# Patient Record
Sex: FEMALE | Race: WHITE
[De-identification: names, ages, dates, MRNs, and addresses within clinical notes are randomized per-mention and may not be internally consistent; named-entity substitution may affect disease eponyms.]

---

## 2020-07-09 ENCOUNTER — APPOINTMENT (OUTPATIENT)
Dept: BARIATRICS/WEIGHT MGMT | Facility: CLINIC | Age: 65
End: 2020-07-09
Payer: COMMERCIAL

## 2020-07-09 DIAGNOSIS — L40.50 ARTHROPATHIC PSORIASIS, UNSPECIFIED: ICD-10-CM

## 2020-07-09 DIAGNOSIS — E66.3 OVERWEIGHT: ICD-10-CM

## 2020-07-09 PROCEDURE — 99214 OFFICE O/P EST MOD 30 MIN: CPT | Mod: 95

## 2020-08-12 NOTE — HISTORY OF PRESENT ILLNESS
[Home] : at home, [unfilled] , at the time of the visit. [Medical Office: ([facility])___] : at the medical office located in  [Verbal consent obtained from patient] : the patient, [unfilled] [FreeTextEntry1] : 65 yo woman with overweight/obesity returns for f/u.  Last visit a few years ago at which time she weight 130lbs.  She dropped a few more lbs and remained comfortably in the upper 120s.  She was started on huira, methotrexate and folic acid and weight went up to 135 lbs (has psoriatic arthritis).  Also had a hip replacement since last seen.  Reports that her body is in pretty good shape and she is at least as active as she was prior.  She has been in [id number] range, though and is having a difficult time getting weight back down. Lifetime max weight = 186.

## 2020-08-12 NOTE — ASSESSMENT
[FreeTextEntry1] : BARIATRIC SURGERY HISTORY: none\par \par OBESITY COMORBIDITIES: none\par \par ANTI-OBESITY MEDICATIONS: none\par \par OBESITY MEDICATION SIDE EFFECTS: n/a\par \par 66 yo woman with obesity, psoriatic arthritis and weight regain\par \par recommend the following:\par forensic investigation of everything she is doing -exercise, sleep, food log\par consider time restricted feeding with early day eating window\par consider anti-obesity medication\par discussed concept of adaptive thermogenesis\par reasonable that pandemic has reduced day to day calorie expenditure in setting of adaptive themogesis making weight loss difficult\par can recheck metabolic labs\par \par after a month can re-discuss with the information provided\par

## 2022-07-21 ENCOUNTER — OFFICE (OUTPATIENT)
Dept: URBAN - METROPOLITAN AREA CLINIC 77 | Facility: CLINIC | Age: 67
Setting detail: OPHTHALMOLOGY
End: 2022-07-21
Payer: COMMERCIAL

## 2022-07-21 ENCOUNTER — RX ONLY (RX ONLY)
Age: 67
End: 2022-07-21

## 2022-07-21 DIAGNOSIS — L40.59: ICD-10-CM

## 2022-07-21 DIAGNOSIS — H35.341: ICD-10-CM

## 2022-07-21 DIAGNOSIS — Z79.899: ICD-10-CM

## 2022-07-21 DIAGNOSIS — H16.223: ICD-10-CM

## 2022-07-21 DIAGNOSIS — H43.812: ICD-10-CM

## 2022-07-21 DIAGNOSIS — H25.13: ICD-10-CM

## 2022-07-21 PROCEDURE — 99214 OFFICE O/P EST MOD 30 MIN: CPT | Performed by: OPHTHALMOLOGY

## 2022-07-21 PROCEDURE — 92134 CPTRZ OPH DX IMG PST SGM RTA: CPT | Performed by: OPHTHALMOLOGY

## 2022-07-21 PROCEDURE — 92201 OPSCPY EXTND RTA DRAW UNI/BI: CPT | Performed by: OPHTHALMOLOGY

## 2022-07-21 PROCEDURE — 92083 EXTENDED VISUAL FIELD XM: CPT | Performed by: OPHTHALMOLOGY

## 2022-07-21 ASSESSMENT — REFRACTION_MANIFEST
OD_CYLINDER: -1.00
OS_CYLINDER: -0.75
OD_AXIS: 55
OS_SPHERE: -3.50
OS_VA1: 20/30
OS_ADD: +3.00
OD_VA1: 20/50
OD_ADD: +3.00
OS_AXIS: 115
OD_SPHERE: -4.00

## 2022-07-21 ASSESSMENT — REFRACTION_CURRENTRX
OS_ADD: +3.00
OD_ADD: +3.00
OD_OVR_VA: 20/
OD_OVR_VA: 20/
OS_AXIS: 120
OS_OVR_VA: 20/
OS_CYLINDER: -0.75
OD_CYLINDER: -1.00
OD_SPHERE: -4.00
OD_AXIS: 50
OS_OVR_VA: 20/
OS_SPHERE: -3.50

## 2022-07-21 ASSESSMENT — AXIALLENGTH_DERIVED
OD_AL: 25.0095
OS_AL: 24.8943
OS_AL: 24.8943
OD_AL: 25.0643

## 2022-07-21 ASSESSMENT — REFRACTION_AUTOREFRACTION
OS_AXIS: 115
OD_CYLINDER: -1.25
OS_CYLINDER: -1.25
OD_SPHERE: -3.75
OD_AXIS: 58
OS_SPHERE: -3.25

## 2022-07-21 ASSESSMENT — KERATOMETRY
OS_AXISANGLE_DEGREES: 014
OS_K1POWER_DIOPTERS: 43.75
OS_K2POWER_DIOPTERS: 44.75
OD_K2POWER_DIOPTERS: 44.75
OD_AXISANGLE_DEGREES: 172
OD_K1POWER_DIOPTERS: 44.25

## 2022-07-21 ASSESSMENT — SPHEQUIV_DERIVED
OS_SPHEQUIV: -3.875
OD_SPHEQUIV: -4.375
OD_SPHEQUIV: -4.5
OS_SPHEQUIV: -3.875

## 2022-07-21 ASSESSMENT — CONFRONTATIONAL VISUAL FIELD TEST (CVF)
OS_FINDINGS: FULL
OD_FINDINGS: FULL

## 2022-07-21 ASSESSMENT — SUPERFICIAL PUNCTATE KERATITIS (SPK)
OD_SPK: 1+
OS_SPK: 1+

## 2022-07-21 ASSESSMENT — VISUAL ACUITY
OD_BCVA: 20/30
OS_BCVA: 20/50

## 2022-07-21 ASSESSMENT — TONOMETRY
OD_IOP_MMHG: 10
OS_IOP_MMHG: 15

## 2022-09-12 ENCOUNTER — OFFICE (OUTPATIENT)
Dept: URBAN - METROPOLITAN AREA CLINIC 76 | Facility: CLINIC | Age: 67
Setting detail: OPHTHALMOLOGY
End: 2022-09-12
Payer: COMMERCIAL

## 2022-09-12 DIAGNOSIS — L40.59: ICD-10-CM

## 2022-09-12 DIAGNOSIS — H01.002: ICD-10-CM

## 2022-09-12 DIAGNOSIS — H01.004: ICD-10-CM

## 2022-09-12 DIAGNOSIS — Z79.899: ICD-10-CM

## 2022-09-12 DIAGNOSIS — H01.005: ICD-10-CM

## 2022-09-12 DIAGNOSIS — H01.001: ICD-10-CM

## 2022-09-12 DIAGNOSIS — H25.13: ICD-10-CM

## 2022-09-12 DIAGNOSIS — H16.223: ICD-10-CM

## 2022-09-12 PROBLEM — H43.812 POSTERIOR VITREOUS DETACHMENT; LEFT EYE: Status: ACTIVE | Noted: 2022-07-21

## 2022-09-12 PROBLEM — H35.341 MACULAR HOLE OR PSEUDOHOLE; RIGHT EYE: Status: ACTIVE | Noted: 2022-07-21

## 2022-09-12 PROCEDURE — 99213 OFFICE O/P EST LOW 20 MIN: CPT | Performed by: OPHTHALMOLOGY

## 2022-09-12 ASSESSMENT — KERATOMETRY
OS_AXISANGLE_DEGREES: 017
OD_K2POWER_DIOPTERS: 44.75
OD_K1POWER_DIOPTERS: 44.00
OS_K2POWER_DIOPTERS: 44.75
OS_K1POWER_DIOPTERS: 43.75
OD_AXISANGLE_DEGREES: 169

## 2022-09-12 ASSESSMENT — DRY EYES - PHYSICIAN NOTES
OD_GENERALCOMMENTS: TBUT 3 SEC
OS_GENERALCOMMENTS: TBUT 3 SEC

## 2022-09-12 ASSESSMENT — REFRACTION_CURRENTRX
OD_OVR_VA: 20/
OS_SPHERE: -3.50
OD_AXIS: 50
OD_ADD: +3.00
OD_SPHERE: -4.00
OS_OVR_VA: 20/
OD_OVR_VA: 20/
OS_ADD: +3.00
OD_CYLINDER: -1.00
OS_AXIS: 120
OS_CYLINDER: -0.75
OS_OVR_VA: 20/

## 2022-09-12 ASSESSMENT — REFRACTION_MANIFEST
OD_ADD: +3.00
OD_CYLINDER: -1.00
OS_ADD: +3.00
OS_CYLINDER: -0.75
OS_SPHERE: -3.50
OS_VA1: 20/30
OD_AXIS: 55
OD_VA1: 20/50
OD_SPHERE: -4.00
OS_AXIS: 115

## 2022-09-12 ASSESSMENT — AXIALLENGTH_DERIVED
OD_AL: 25.1716
OD_AL: 25.1162
OS_AL: 24.8943
OS_AL: 24.9486

## 2022-09-12 ASSESSMENT — TEAR BREAK UP TIME (TBUT)
OD_TBUT: 2+
OS_TBUT: 2+

## 2022-09-12 ASSESSMENT — VISUAL ACUITY
OD_BCVA: 20/40
OS_BCVA: 20/50

## 2022-09-12 ASSESSMENT — SPHEQUIV_DERIVED
OD_SPHEQUIV: -4.625
OD_SPHEQUIV: -4.5
OS_SPHEQUIV: -4
OS_SPHEQUIV: -3.875

## 2022-09-12 ASSESSMENT — TONOMETRY
OD_IOP_MMHG: 14
OS_IOP_MMHG: 15

## 2022-09-12 ASSESSMENT — REFRACTION_AUTOREFRACTION
OS_CYLINDER: -1.50
OS_SPHERE: -3.25
OD_AXIS: 078
OS_AXIS: 104
OD_SPHERE: -3.75
OD_CYLINDER: -1.75

## 2022-09-12 ASSESSMENT — LID EXAM ASSESSMENTS
OD_BLEPHARITIS: 1+
OS_BLEPHARITIS: 1+

## 2022-09-12 ASSESSMENT — SUPERFICIAL PUNCTATE KERATITIS (SPK)
OS_SPK: 1+
OD_SPK: 1+

## 2022-12-19 ENCOUNTER — OFFICE (OUTPATIENT)
Dept: URBAN - METROPOLITAN AREA CLINIC 77 | Facility: CLINIC | Age: 67
Setting detail: OPHTHALMOLOGY
End: 2022-12-19
Payer: COMMERCIAL

## 2022-12-19 ENCOUNTER — RX ONLY (RX ONLY)
Age: 67
End: 2022-12-19

## 2022-12-19 DIAGNOSIS — H01.002: ICD-10-CM

## 2022-12-19 DIAGNOSIS — H01.004: ICD-10-CM

## 2022-12-19 DIAGNOSIS — H35.341: ICD-10-CM

## 2022-12-19 DIAGNOSIS — Z79.899: ICD-10-CM

## 2022-12-19 DIAGNOSIS — L63.8: ICD-10-CM

## 2022-12-19 DIAGNOSIS — H16.223: ICD-10-CM

## 2022-12-19 DIAGNOSIS — L40.59: ICD-10-CM

## 2022-12-19 DIAGNOSIS — H01.001: ICD-10-CM

## 2022-12-19 DIAGNOSIS — H43.812: ICD-10-CM

## 2022-12-19 DIAGNOSIS — H01.005: ICD-10-CM

## 2022-12-19 DIAGNOSIS — H25.13: ICD-10-CM

## 2022-12-19 PROCEDURE — 92250 FUNDUS PHOTOGRAPHY W/I&R: CPT | Performed by: OPHTHALMOLOGY

## 2022-12-19 PROCEDURE — 99214 OFFICE O/P EST MOD 30 MIN: CPT | Performed by: OPHTHALMOLOGY

## 2022-12-19 ASSESSMENT — KERATOMETRY
OS_K1POWER_DIOPTERS: 43.75
OD_K1POWER_DIOPTERS: 44.00
OS_K2POWER_DIOPTERS: 44.75
OD_K2POWER_DIOPTERS: 44.75
OD_AXISANGLE_DEGREES: 169
OS_AXISANGLE_DEGREES: 017

## 2022-12-19 ASSESSMENT — DRY EYES - PHYSICIAN NOTES
OS_GENERALCOMMENTS: TBUT 3 SEC
OD_GENERALCOMMENTS: TBUT 3 SEC

## 2022-12-19 ASSESSMENT — LID EXAM ASSESSMENTS
OS_BLEPHARITIS: 1+
OD_BLEPHARITIS: 1+

## 2022-12-19 ASSESSMENT — REFRACTION_CURRENTRX
OS_OVR_VA: 20/
OD_ADD: +3.00
OS_ADD: +3.00
OD_OVR_VA: 20/
OS_AXIS: 120
OS_SPHERE: -3.50
OS_CYLINDER: -0.75
OD_SPHERE: -4.00
OS_OVR_VA: 20/
OD_CYLINDER: -1.00
OD_OVR_VA: 20/
OD_AXIS: 50

## 2022-12-19 ASSESSMENT — AXIALLENGTH_DERIVED
OS_AL: 24.8943
OD_AL: 25.1716
OS_AL: 24.9486
OD_AL: 25.1162

## 2022-12-19 ASSESSMENT — REFRACTION_AUTOREFRACTION
OS_SPHERE: -3.25
OS_CYLINDER: -1.50
OD_AXIS: 078
OD_SPHERE: -3.75
OD_CYLINDER: -1.75
OS_AXIS: 104

## 2022-12-19 ASSESSMENT — SUPERFICIAL PUNCTATE KERATITIS (SPK)
OS_SPK: 1+
OD_SPK: 1+

## 2022-12-19 ASSESSMENT — REFRACTION_MANIFEST
OD_VA1: 20/50
OD_CYLINDER: -1.00
OD_AXIS: 55
OS_VA1: 20/30
OS_SPHERE: -3.50
OD_SPHERE: -4.00
OS_CYLINDER: -0.75
OS_AXIS: 115
OS_ADD: +3.00
OD_ADD: +3.00

## 2022-12-19 ASSESSMENT — TONOMETRY
OS_IOP_MMHG: 13
OD_IOP_MMHG: 13

## 2022-12-19 ASSESSMENT — SPHEQUIV_DERIVED
OS_SPHEQUIV: -3.875
OS_SPHEQUIV: -4
OD_SPHEQUIV: -4.625
OD_SPHEQUIV: -4.5

## 2022-12-19 ASSESSMENT — CONFRONTATIONAL VISUAL FIELD TEST (CVF)
OS_FINDINGS: FULL
OD_FINDINGS: FULL

## 2022-12-19 ASSESSMENT — VISUAL ACUITY
OS_BCVA: 20/60
OD_BCVA: 20/50

## 2022-12-19 ASSESSMENT — TEAR BREAK UP TIME (TBUT)
OD_TBUT: 2+
OS_TBUT: 2+

## 2023-02-13 ENCOUNTER — OFFICE (OUTPATIENT)
Dept: URBAN - METROPOLITAN AREA CLINIC 77 | Facility: CLINIC | Age: 68
Setting detail: OPHTHALMOLOGY
End: 2023-02-13
Payer: COMMERCIAL

## 2023-02-13 DIAGNOSIS — H35.341: ICD-10-CM

## 2023-02-13 DIAGNOSIS — H01.005: ICD-10-CM

## 2023-02-13 DIAGNOSIS — H16.223: ICD-10-CM

## 2023-02-13 DIAGNOSIS — H01.001: ICD-10-CM

## 2023-02-13 DIAGNOSIS — L40.59: ICD-10-CM

## 2023-02-13 DIAGNOSIS — H01.002: ICD-10-CM

## 2023-02-13 DIAGNOSIS — L63.8: ICD-10-CM

## 2023-02-13 DIAGNOSIS — H01.004: ICD-10-CM

## 2023-02-13 DIAGNOSIS — Z79.899: ICD-10-CM

## 2023-02-13 DIAGNOSIS — H43.812: ICD-10-CM

## 2023-02-13 DIAGNOSIS — H25.13: ICD-10-CM

## 2023-02-13 PROCEDURE — 92250 FUNDUS PHOTOGRAPHY W/I&R: CPT | Performed by: OPHTHALMOLOGY

## 2023-02-13 PROCEDURE — 99213 OFFICE O/P EST LOW 20 MIN: CPT | Performed by: OPHTHALMOLOGY

## 2023-02-13 ASSESSMENT — REFRACTION_CURRENTRX
OD_OVR_VA: 20/
OS_AXIS: 120
OS_OVR_VA: 20/
OS_ADD: +3.00
OD_AXIS: 50
OD_ADD: +3.00
OD_SPHERE: -4.00
OD_OVR_VA: 20/
OS_SPHERE: -3.50
OS_CYLINDER: -0.75
OS_OVR_VA: 20/
OD_CYLINDER: -1.00

## 2023-02-13 ASSESSMENT — SPHEQUIV_DERIVED
OD_SPHEQUIV: -4.625
OS_SPHEQUIV: -4
OD_SPHEQUIV: -4.5
OS_SPHEQUIV: -3.875

## 2023-02-13 ASSESSMENT — AXIALLENGTH_DERIVED
OD_AL: 25.1716
OS_AL: 24.8943
OS_AL: 24.9486
OD_AL: 25.1162

## 2023-02-13 ASSESSMENT — REFRACTION_MANIFEST
OS_CYLINDER: -0.75
OD_CYLINDER: -1.00
OD_VA1: 20/50
OS_SPHERE: -3.50
OD_ADD: +3.00
OS_VA1: 20/30
OD_SPHERE: -4.00
OS_AXIS: 115
OD_AXIS: 55
OS_ADD: +3.00

## 2023-02-13 ASSESSMENT — CONFRONTATIONAL VISUAL FIELD TEST (CVF)
OS_FINDINGS: FULL
OD_FINDINGS: FULL

## 2023-02-13 ASSESSMENT — VISUAL ACUITY
OS_BCVA: 20/60
OD_BCVA: 20/50

## 2023-02-13 ASSESSMENT — REFRACTION_AUTOREFRACTION
OS_AXIS: 104
OD_AXIS: 078
OS_SPHERE: -3.25
OS_CYLINDER: -1.50
OD_CYLINDER: -1.75
OD_SPHERE: -3.75

## 2023-02-13 ASSESSMENT — KERATOMETRY
OD_K1POWER_DIOPTERS: 44.00
OD_K2POWER_DIOPTERS: 44.75
OS_K2POWER_DIOPTERS: 44.75
OS_K1POWER_DIOPTERS: 43.75
OD_AXISANGLE_DEGREES: 169
OS_AXISANGLE_DEGREES: 017

## 2023-02-13 ASSESSMENT — SUPERFICIAL PUNCTATE KERATITIS (SPK)
OD_SPK: 1+
OS_SPK: 1+

## 2023-02-13 ASSESSMENT — LID EXAM ASSESSMENTS
OD_BLEPHARITIS: 1+
OS_BLEPHARITIS: 1+

## 2023-02-13 ASSESSMENT — TEAR BREAK UP TIME (TBUT)
OD_TBUT: 2+
OS_TBUT: 2+

## 2023-02-13 ASSESSMENT — DRY EYES - PHYSICIAN NOTES
OS_GENERALCOMMENTS: TBUT 3 SEC
OD_GENERALCOMMENTS: TBUT 3 SEC

## 2023-07-24 ENCOUNTER — OFFICE (OUTPATIENT)
Dept: URBAN - METROPOLITAN AREA CLINIC 77 | Facility: CLINIC | Age: 68
Setting detail: OPHTHALMOLOGY
End: 2023-07-24
Payer: COMMERCIAL

## 2023-07-24 DIAGNOSIS — L63.8: ICD-10-CM

## 2023-07-24 DIAGNOSIS — L40.59: ICD-10-CM

## 2023-07-24 DIAGNOSIS — H01.005: ICD-10-CM

## 2023-07-24 DIAGNOSIS — H01.001: ICD-10-CM

## 2023-07-24 DIAGNOSIS — H25.13: ICD-10-CM

## 2023-07-24 DIAGNOSIS — H43.812: ICD-10-CM

## 2023-07-24 DIAGNOSIS — H35.341: ICD-10-CM

## 2023-07-24 DIAGNOSIS — H01.004: ICD-10-CM

## 2023-07-24 DIAGNOSIS — H01.002: ICD-10-CM

## 2023-07-24 DIAGNOSIS — H16.223: ICD-10-CM

## 2023-07-24 DIAGNOSIS — Z79.899: ICD-10-CM

## 2023-07-24 PROCEDURE — 92250 FUNDUS PHOTOGRAPHY W/I&R: CPT | Performed by: OPHTHALMOLOGY

## 2023-07-24 PROCEDURE — 99214 OFFICE O/P EST MOD 30 MIN: CPT | Performed by: OPHTHALMOLOGY

## 2023-07-24 ASSESSMENT — REFRACTION_CURRENTRX
OS_AXIS: 120
OD_OVR_VA: 20/
OD_SPHERE: -4.00
OS_SPHERE: -3.50
OS_ADD: +3.00
OD_AXIS: 50
OD_OVR_VA: 20/
OS_OVR_VA: 20/
OS_CYLINDER: -0.75
OS_OVR_VA: 20/
OD_CYLINDER: -1.00
OD_ADD: +3.00

## 2023-07-24 ASSESSMENT — REFRACTION_AUTOREFRACTION
OS_CYLINDER: -1.50
OD_CYLINDER: -1.75
OD_SPHERE: -3.75
OS_SPHERE: -3.25
OD_AXIS: 078
OS_AXIS: 104

## 2023-07-24 ASSESSMENT — LID EXAM ASSESSMENTS
OS_BLEPHARITIS: 1+
OD_BLEPHARITIS: 1+

## 2023-07-24 ASSESSMENT — KERATOMETRY
OS_K1POWER_DIOPTERS: 43.75
OS_K2POWER_DIOPTERS: 44.75
OD_AXISANGLE_DEGREES: 169
OD_K2POWER_DIOPTERS: 44.75
OS_AXISANGLE_DEGREES: 017
OD_K1POWER_DIOPTERS: 44.00

## 2023-07-24 ASSESSMENT — REFRACTION_MANIFEST
OD_ADD: +3.00
OS_ADD: +3.00
OD_VA1: 20/50
OS_VA1: 20/30
OS_SPHERE: -3.50
OD_CYLINDER: -1.00
OS_AXIS: 115
OD_AXIS: 55
OS_CYLINDER: -0.75
OD_SPHERE: -4.00

## 2023-07-24 ASSESSMENT — DRY EYES - PHYSICIAN NOTES
OD_GENERALCOMMENTS: TBUT 3 SEC
OS_GENERALCOMMENTS: TBUT 3 SEC

## 2023-07-24 ASSESSMENT — VISUAL ACUITY
OD_BCVA: 20/50
OS_BCVA: 20/60

## 2023-07-24 ASSESSMENT — AXIALLENGTH_DERIVED
OD_AL: 25.1716
OD_AL: 25.1162
OS_AL: 24.9486
OS_AL: 24.8943

## 2023-07-24 ASSESSMENT — CONFRONTATIONAL VISUAL FIELD TEST (CVF)
OD_FINDINGS: FULL
OS_FINDINGS: FULL

## 2023-07-24 ASSESSMENT — SUPERFICIAL PUNCTATE KERATITIS (SPK)
OD_SPK: 1+
OS_SPK: 1+

## 2023-07-24 ASSESSMENT — SPHEQUIV_DERIVED
OD_SPHEQUIV: -4.5
OS_SPHEQUIV: -3.875
OD_SPHEQUIV: -4.625
OS_SPHEQUIV: -4

## 2023-07-24 ASSESSMENT — TEAR BREAK UP TIME (TBUT)
OD_TBUT: 2+
OS_TBUT: 2+

## 2024-05-10 ENCOUNTER — OFFICE (OUTPATIENT)
Dept: URBAN - METROPOLITAN AREA CLINIC 76 | Facility: CLINIC | Age: 69
Setting detail: OPHTHALMOLOGY
End: 2024-05-10
Payer: COMMERCIAL

## 2024-05-10 DIAGNOSIS — H01.002: ICD-10-CM

## 2024-05-10 DIAGNOSIS — L40.59: ICD-10-CM

## 2024-05-10 DIAGNOSIS — H16.223: ICD-10-CM

## 2024-05-10 DIAGNOSIS — H01.001: ICD-10-CM

## 2024-05-10 DIAGNOSIS — H10.45: ICD-10-CM

## 2024-05-10 DIAGNOSIS — H01.004: ICD-10-CM

## 2024-05-10 DIAGNOSIS — H25.13: ICD-10-CM

## 2024-05-10 DIAGNOSIS — H01.005: ICD-10-CM

## 2024-05-10 PROCEDURE — 99212 OFFICE O/P EST SF 10 MIN: CPT | Performed by: OPTOMETRIST

## 2024-05-10 ASSESSMENT — LID EXAM ASSESSMENTS
OS_BLEPHARITIS: 1+
OD_BLEPHARITIS: 1+

## 2025-03-31 ENCOUNTER — OFFICE (OUTPATIENT)
Dept: URBAN - METROPOLITAN AREA CLINIC 77 | Facility: CLINIC | Age: 70
Setting detail: OPHTHALMOLOGY
End: 2025-03-31
Payer: COMMERCIAL

## 2025-03-31 DIAGNOSIS — H35.341: ICD-10-CM

## 2025-03-31 DIAGNOSIS — H01.004: ICD-10-CM

## 2025-03-31 DIAGNOSIS — H01.001: ICD-10-CM

## 2025-03-31 DIAGNOSIS — H16.223: ICD-10-CM

## 2025-03-31 DIAGNOSIS — H01.002: ICD-10-CM

## 2025-03-31 PROCEDURE — 99214 OFFICE O/P EST MOD 30 MIN: CPT | Performed by: OPHTHALMOLOGY

## 2025-03-31 PROCEDURE — 92250 FUNDUS PHOTOGRAPHY W/I&R: CPT | Performed by: OPHTHALMOLOGY

## 2025-03-31 ASSESSMENT — REFRACTION_CURRENTRX
OD_SPHERE: -4.00
OS_ADD: +3.00
OD_OVR_VA: 20/
OS_SPHERE: -3.50
OD_ADD: +3.00
OD_OVR_VA: 20/
OS_OVR_VA: 20/
OD_CYLINDER: -1.00
OS_CYLINDER: -0.75
OD_AXIS: 50
OS_OVR_VA: 20/
OS_AXIS: 120

## 2025-03-31 ASSESSMENT — REFRACTION_MANIFEST
OS_ADD: +3.00
OD_ADD: +3.00
OD_VA1: 20/50
OD_SPHERE: -4.00
OS_SPHERE: -3.50
OD_CYLINDER: -1.00
OS_AXIS: 115
OD_AXIS: 55
OS_CYLINDER: -0.75
OS_VA1: 20/30

## 2025-03-31 ASSESSMENT — CONFRONTATIONAL VISUAL FIELD TEST (CVF)
OS_FINDINGS: FULL
OD_FINDINGS: FULL

## 2025-03-31 ASSESSMENT — VISUAL ACUITY
OD_BCVA: 20/60+
OS_BCVA: 20/100-